# Patient Record
Sex: FEMALE | URBAN - METROPOLITAN AREA
[De-identification: names, ages, dates, MRNs, and addresses within clinical notes are randomized per-mention and may not be internally consistent; named-entity substitution may affect disease eponyms.]

---

## 2019-05-01 ENCOUNTER — IMPORTED ENCOUNTER (OUTPATIENT)
Dept: URBAN - METROPOLITAN AREA CLINIC 1 | Facility: CLINIC | Age: 79
End: 2019-05-01

## 2019-05-01 PROBLEM — E11.3293: Noted: 2019-05-01

## 2019-05-01 PROBLEM — H40.053: Noted: 2019-05-01

## 2019-05-01 PROBLEM — H18.413: Noted: 2019-05-01

## 2019-05-01 PROBLEM — Z79.4: Noted: 2019-05-01

## 2019-05-01 PROBLEM — H04.123: Noted: 2019-05-01

## 2019-05-01 PROBLEM — H40.033: Noted: 2019-05-01

## 2019-05-01 PROBLEM — H25.813: Noted: 2019-05-01

## 2019-05-01 PROBLEM — H16.143: Noted: 2019-05-01

## 2019-05-01 PROCEDURE — 92004 COMPRE OPH EXAM NEW PT 1/>: CPT

## 2019-05-01 PROCEDURE — 92015 DETERMINE REFRACTIVE STATE: CPT

## 2019-05-01 NOTE — PATIENT DISCUSSION
1.  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 Stressed the importance of strong glucose control. Advised of importance of at least yearly dilated examinations but to contact us immediately for any problems or concerns. 2. TAE w/ PEK OU- Recommend ATs QID OU routinely (sample of Refresh Repair given) 3. Cataract OU: Visually significant however patient wishes to observe for now without intervention. The patient was advised to contact us if any change or worsening of vision4. OHTN (CD 0.40 OU) - IOP 24/26. Observe for changes/progression. Will have patient return for further w/u. Will hold on gtts5. Narrow Angles OU 6. [facility] OUMRX for glasses given Return for an appointment in 3-4 weeks 10/OCT/HVF/glafely with Dr. Henrietta Rodgers.

## 2019-06-05 ENCOUNTER — IMPORTED ENCOUNTER (OUTPATIENT)
Dept: URBAN - METROPOLITAN AREA CLINIC 1 | Facility: CLINIC | Age: 79
End: 2019-06-05

## 2019-06-05 PROBLEM — H40.013: Noted: 2019-06-05

## 2019-06-05 PROCEDURE — 92133 CPTRZD OPH DX IMG PST SGM ON: CPT

## 2019-06-05 PROCEDURE — 92083 EXTENDED VISUAL FIELD XM: CPT

## 2019-06-05 PROCEDURE — 92012 INTRM OPH EXAM EST PATIENT: CPT

## 2019-06-05 NOTE — PATIENT DISCUSSION
1.  Glaucoma Suspect OU/ OHTN (CD 0.40 OU) - IOP Max 24/26. OCT/ VF unable. (Pt unable due to head placement/ wheel chair bound) IOP improved OU on no gtts. Will continue to observe on no medication. 2.  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 TAE w/ PEK OU- Cont ATs TID OU Routinely. 4.  Cataract OU:Re-evaluate for Phaco next visit. 5.  Narrow Angles OU 6. Renetta Lopez for an appointment in 6 mo 10 dfe glare consider Phaco with Dr. Monica Moreno.

## 2022-04-08 ASSESSMENT — TONOMETRY
OD_IOP_MMHG: 21
OS_IOP_MMHG: 26
OD_IOP_MMHG: 24
OS_IOP_MMHG: 22

## 2022-04-08 ASSESSMENT — VISUAL ACUITY
OD_CC: J2
OS_GLARE: 20/100
OS_GLARE: 20/100
OS_CC: J10
OS_SC: 20/30-2
OD_CC: J10
OS_SC: 20/70
OD_GLARE: 20/100
OS_CC: J2
OD_SC: 20/40
OD_GLARE: 20/100
OD_SC: 20/70